# Patient Record
Sex: FEMALE | Race: WHITE | NOT HISPANIC OR LATINO | Employment: OTHER | URBAN - METROPOLITAN AREA
[De-identification: names, ages, dates, MRNs, and addresses within clinical notes are randomized per-mention and may not be internally consistent; named-entity substitution may affect disease eponyms.]

---

## 2017-04-04 ENCOUNTER — RECORDS - HEALTHEAST (OUTPATIENT)
Dept: LAB | Facility: CLINIC | Age: 62
End: 2017-04-04

## 2017-04-04 LAB
CHOLEST SERPL-MCNC: 199 MG/DL
FASTING STATUS PATIENT QL REPORTED: YES
HDLC SERPL-MCNC: 46 MG/DL
LDLC SERPL CALC-MCNC: 134 MG/DL
TRIGL SERPL-MCNC: 96 MG/DL

## 2017-04-06 ENCOUNTER — HOSPITAL ENCOUNTER (OUTPATIENT)
Dept: MAMMOGRAPHY | Facility: CLINIC | Age: 62
Discharge: HOME | End: 2017-04-06
Attending: PHYSICIAN ASSISTANT

## 2017-04-06 DIAGNOSIS — Z12.31 VISIT FOR SCREENING MAMMOGRAM: ICD-10-CM

## 2017-04-10 ENCOUNTER — RECORDS - HEALTHEAST (OUTPATIENT)
Dept: ADMINISTRATIVE | Facility: OTHER | Age: 62
End: 2017-04-10

## 2017-06-05 ENCOUNTER — RECORDS - HEALTHEAST (OUTPATIENT)
Dept: BONE DENSITY | Facility: CLINIC | Age: 62
End: 2017-06-05

## 2017-06-05 ENCOUNTER — RECORDS - HEALTHEAST (OUTPATIENT)
Dept: ADMINISTRATIVE | Facility: OTHER | Age: 62
End: 2017-06-05

## 2017-06-05 DIAGNOSIS — Z78.0 ASYMPTOMATIC MENOPAUSAL STATE: ICD-10-CM

## 2017-06-26 ENCOUNTER — AMBULATORY - HEALTHEAST (OUTPATIENT)
Dept: ADMINISTRATIVE | Facility: REHABILITATION | Age: 62
End: 2017-06-26

## 2017-06-26 DIAGNOSIS — Z98.890 S/P FOOT SURGERY, LEFT: ICD-10-CM

## 2017-06-26 DIAGNOSIS — M25.572 LEFT ANKLE PAIN: ICD-10-CM

## 2017-06-28 ENCOUNTER — OFFICE VISIT - HEALTHEAST (OUTPATIENT)
Dept: PHYSICAL THERAPY | Facility: REHABILITATION | Age: 62
End: 2017-06-28

## 2017-06-28 DIAGNOSIS — M25.572 ACUTE LEFT ANKLE PAIN: ICD-10-CM

## 2017-06-28 DIAGNOSIS — M25.673 DECREASED ROM OF ANKLE: ICD-10-CM

## 2017-07-12 ENCOUNTER — OFFICE VISIT - HEALTHEAST (OUTPATIENT)
Dept: PHYSICAL THERAPY | Facility: REHABILITATION | Age: 62
End: 2017-07-12

## 2017-07-12 DIAGNOSIS — M25.572 ACUTE LEFT ANKLE PAIN: ICD-10-CM

## 2017-07-12 DIAGNOSIS — M25.673 DECREASED ROM OF ANKLE: ICD-10-CM

## 2017-07-14 ENCOUNTER — OFFICE VISIT - HEALTHEAST (OUTPATIENT)
Dept: PHYSICAL THERAPY | Facility: REHABILITATION | Age: 62
End: 2017-07-14

## 2017-07-14 DIAGNOSIS — M25.572 ACUTE LEFT ANKLE PAIN: ICD-10-CM

## 2017-07-14 DIAGNOSIS — M25.673 DECREASED ROM OF ANKLE: ICD-10-CM

## 2017-07-17 ENCOUNTER — OFFICE VISIT - HEALTHEAST (OUTPATIENT)
Dept: PHYSICAL THERAPY | Facility: REHABILITATION | Age: 62
End: 2017-07-17

## 2017-07-17 DIAGNOSIS — M25.572 ACUTE LEFT ANKLE PAIN: ICD-10-CM

## 2017-07-17 DIAGNOSIS — M25.673 DECREASED ROM OF ANKLE: ICD-10-CM

## 2017-07-21 ENCOUNTER — OFFICE VISIT - HEALTHEAST (OUTPATIENT)
Dept: PHYSICAL THERAPY | Facility: REHABILITATION | Age: 62
End: 2017-07-21

## 2017-07-21 DIAGNOSIS — M25.673 DECREASED ROM OF ANKLE: ICD-10-CM

## 2017-07-21 DIAGNOSIS — M25.572 ACUTE LEFT ANKLE PAIN: ICD-10-CM

## 2017-07-26 ENCOUNTER — OFFICE VISIT - HEALTHEAST (OUTPATIENT)
Dept: PHYSICAL THERAPY | Facility: REHABILITATION | Age: 62
End: 2017-07-26

## 2017-07-26 DIAGNOSIS — M25.572 ACUTE LEFT ANKLE PAIN: ICD-10-CM

## 2017-07-26 DIAGNOSIS — M25.673 DECREASED ROM OF ANKLE: ICD-10-CM

## 2017-08-10 ENCOUNTER — OFFICE VISIT - HEALTHEAST (OUTPATIENT)
Dept: PHYSICAL THERAPY | Facility: REHABILITATION | Age: 62
End: 2017-08-10

## 2017-08-10 DIAGNOSIS — M25.673 DECREASED ROM OF ANKLE: ICD-10-CM

## 2017-08-10 DIAGNOSIS — M25.572 ACUTE LEFT ANKLE PAIN: ICD-10-CM

## 2017-08-23 ENCOUNTER — OFFICE VISIT - HEALTHEAST (OUTPATIENT)
Dept: PHYSICAL THERAPY | Facility: REHABILITATION | Age: 62
End: 2017-08-23

## 2017-08-23 DIAGNOSIS — M25.572 ACUTE LEFT ANKLE PAIN: ICD-10-CM

## 2017-08-23 DIAGNOSIS — M25.673 DECREASED ROM OF ANKLE: ICD-10-CM

## 2017-08-23 DIAGNOSIS — Z78.0 ASYMPTOMATIC POSTMENOPAUSAL STATUS: ICD-10-CM

## 2018-04-11 ENCOUNTER — HOSPITAL ENCOUNTER (OUTPATIENT)
Dept: MAMMOGRAPHY | Facility: CLINIC | Age: 63
Discharge: HOME | End: 2018-04-11
Attending: PHYSICIAN ASSISTANT

## 2018-04-11 DIAGNOSIS — Z12.31 VISIT FOR SCREENING MAMMOGRAM: ICD-10-CM

## 2019-05-29 ENCOUNTER — HOSPITAL ENCOUNTER (OUTPATIENT)
Dept: MAMMOGRAPHY | Facility: CLINIC | Age: 64
Discharge: HOME | End: 2019-05-29
Attending: PHYSICIAN ASSISTANT

## 2019-05-29 DIAGNOSIS — Z12.31 VISIT FOR SCREENING MAMMOGRAM: ICD-10-CM

## 2019-12-23 ENCOUNTER — RECORDS - HEALTHEAST (OUTPATIENT)
Dept: LAB | Facility: CLINIC | Age: 64
End: 2019-12-23

## 2019-12-26 LAB — BACTERIA SPEC CULT: ABNORMAL

## 2020-01-09 ENCOUNTER — RECORDS - HEALTHEAST (OUTPATIENT)
Dept: LAB | Facility: CLINIC | Age: 65
End: 2020-01-09

## 2020-01-09 ENCOUNTER — RECORDS - HEALTHEAST (OUTPATIENT)
Dept: ADMINISTRATIVE | Facility: OTHER | Age: 65
End: 2020-01-09

## 2020-01-09 LAB
ALBUMIN SERPL-MCNC: 3.7 G/DL (ref 3.5–5)
ALP SERPL-CCNC: 63 U/L (ref 45–120)
ALT SERPL W P-5'-P-CCNC: 38 U/L (ref 0–45)
ANION GAP SERPL CALCULATED.3IONS-SCNC: 8 MMOL/L (ref 5–18)
AST SERPL W P-5'-P-CCNC: 27 U/L (ref 0–40)
BILIRUB SERPL-MCNC: 0.6 MG/DL (ref 0–1)
BUN SERPL-MCNC: 11 MG/DL (ref 8–22)
CALCIUM SERPL-MCNC: 8.9 MG/DL (ref 8.5–10.5)
CHLORIDE BLD-SCNC: 104 MMOL/L (ref 98–107)
CHOLEST SERPL-MCNC: 198 MG/DL
CO2 SERPL-SCNC: 27 MMOL/L (ref 22–31)
CREAT SERPL-MCNC: 0.7 MG/DL (ref 0.6–1.1)
FASTING STATUS PATIENT QL REPORTED: YES
GFR SERPL CREATININE-BSD FRML MDRD: >60 ML/MIN/1.73M2
GLUCOSE BLD-MCNC: 106 MG/DL (ref 70–125)
HDLC SERPL-MCNC: 42 MG/DL
LDLC SERPL CALC-MCNC: 121 MG/DL
POTASSIUM BLD-SCNC: 4.2 MMOL/L (ref 3.5–5)
PROT SERPL-MCNC: 7.2 G/DL (ref 6–8)
SODIUM SERPL-SCNC: 139 MMOL/L (ref 136–145)
TRIGL SERPL-MCNC: 173 MG/DL

## 2020-01-13 ENCOUNTER — RECORDS - HEALTHEAST (OUTPATIENT)
Dept: ADMINISTRATIVE | Facility: OTHER | Age: 65
End: 2020-01-13

## 2020-01-28 ENCOUNTER — RECORDS - HEALTHEAST (OUTPATIENT)
Dept: BONE DENSITY | Facility: CLINIC | Age: 65
End: 2020-01-28

## 2020-01-28 ENCOUNTER — RECORDS - HEALTHEAST (OUTPATIENT)
Dept: ADMINISTRATIVE | Facility: OTHER | Age: 65
End: 2020-01-28

## 2020-01-28 DIAGNOSIS — Z13.820 ENCOUNTER FOR SCREENING FOR OSTEOPOROSIS: ICD-10-CM

## 2020-01-28 DIAGNOSIS — Z78.0 ASYMPTOMATIC MENOPAUSAL STATE: ICD-10-CM

## 2020-06-17 ENCOUNTER — HOSPITAL ENCOUNTER (OUTPATIENT)
Dept: MAMMOGRAPHY | Facility: CLINIC | Age: 65
Discharge: HOME | End: 2020-06-17

## 2020-06-17 ENCOUNTER — COMMUNICATION - HEALTHEAST (OUTPATIENT)
Dept: TELEHEALTH | Facility: CLINIC | Age: 65
End: 2020-06-17

## 2020-06-17 DIAGNOSIS — Z12.31 SCREENING MAMMOGRAM, ENCOUNTER FOR: ICD-10-CM

## 2021-05-24 ENCOUNTER — RECORDS - HEALTHEAST (OUTPATIENT)
Dept: ADMINISTRATIVE | Facility: CLINIC | Age: 66
End: 2021-05-24

## 2021-05-26 ENCOUNTER — RECORDS - HEALTHEAST (OUTPATIENT)
Dept: ADMINISTRATIVE | Facility: CLINIC | Age: 66
End: 2021-05-26

## 2021-05-27 ENCOUNTER — RECORDS - HEALTHEAST (OUTPATIENT)
Dept: ADMINISTRATIVE | Facility: CLINIC | Age: 66
End: 2021-05-27

## 2021-05-28 ENCOUNTER — RECORDS - HEALTHEAST (OUTPATIENT)
Dept: ADMINISTRATIVE | Facility: CLINIC | Age: 66
End: 2021-05-28

## 2021-05-29 ENCOUNTER — RECORDS - HEALTHEAST (OUTPATIENT)
Dept: ADMINISTRATIVE | Facility: CLINIC | Age: 66
End: 2021-05-29

## 2021-05-30 ENCOUNTER — RECORDS - HEALTHEAST (OUTPATIENT)
Dept: ADMINISTRATIVE | Facility: CLINIC | Age: 66
End: 2021-05-30

## 2021-05-31 ENCOUNTER — RECORDS - HEALTHEAST (OUTPATIENT)
Dept: ADMINISTRATIVE | Facility: CLINIC | Age: 66
End: 2021-05-31

## 2021-06-11 NOTE — PROGRESS NOTES
[facility]   Foot/Ankle Initial Evaluation    Patient Name: Madelin Payton  Date of evaluation: 6/28/2017  Referral Diagnosis: S/P foot surgery, left  Referring provider: Cassidy Valderrama PA-C  Visit Diagnosis:     ICD-10-CM    1. Decreased ROM of ankle M25.673    2. Acute left ankle pain M25.572        Assessment:    Madelin was evaluated for post op ankle surgery that occurred on 4/12/17. Patient stated concern with pain, unsteadiness and limited strength effecting her function. Patient expressed a of a fear of falling because of unsteadiness and weakness in the L leg and ankle. PT evaluation found decreased dorsiflexion ROM in L ankle and weakness in inversion of the L ankle. PT started HEP this session with ankle ROM exercise strengthening and stretching including a SLS. PT to focus on LE strength and ROM, starting seeing patient twice a week and reassessing in 2 weeks to adjust frequency. PT educated patient on plan of care, patient agreed and is willing to start therapy.    Pt. is appropriate for skilled PT intervention as outlined in the Plan of Care (POC).  Pt. is a good candidate for skilled PT services to improve pain levels and function.    Goals:  Pt. will be independent with home exercise program in : 4 weeks  Pt will: increase distance able to walk to 4 blocks without stopping, within 6 weeks.  Pt will: increase L ankle dorsiflextion ROM to greater than 10 degrees within 6 weeks  Pt will: increase score on LEFS by 20 points, within 8weeks    Barriers to Learning or Achieving Goals:  No Barriers.    Patient's expectations/goals are realistic.       Plan / Patient Instructions:    Patient would like to work on: balance, strengthing of LE, ROM     Plan of Care:   Communication with: Referral Source  Patient Related Instruction: Nature of Condition;Treatment plan and rationale;Self Care instruction;Basis of treatment;Body mechanics  Times per Week: 1-2  Number of Weeks: 12  Number of Visits:  12  Precautions / Restrictions : weight bearing as tolerated  Therapeutic Exercise: ROM;Stretching;Strengthening  Neuromuscular Reeducation: balance/proprioception;core  Manual Therapy: soft tissue mobilization;joint mobilization;myofascial release;muscle energy  Other Plan #1: prn    Plan for next visit: progress balance exercise, foam balance, standing hip abduction,      Subjective:       History of Present Illness:    Madelin is a 61 y.o. female who presents to therapy today for physical therapy post op ankle surgery 17. She underwent a posterior tibial tendonrepair with a tendon transfer on the L ankle secondary to chronic changes over time. She denies any history of injury. Per report, she has lost strength and flexibility and wants to address her stability.  Currently she is struggling with steadiness and standing duration. She wears the air cast when she wants more stability when she is working. Also of note, she does report some catching in the same sided L knee as well as some vascular changes with venous pooling on the L. The pooling is located around her neoprene sleeve.      Pain Ratin  Pain rating at best: 0, when sitting sometimes  Pain rating at worst: 7  Pain description: tingling and throabing and stinging,       She has worked part time, she works one 24 hour shift per week. She tries to elevate and ice her foot as much as possible.     Functional limitations are described as occurring with:   balance  standing long periods  walking for 2 blocks    Patient reports benefit from:  anti-inflammatory, pain medication, cold     Objective:      Note: Items left blank indicates the item was not performed or not indicated at the time of the evaluation.    Patient Outcome Measures :      Lower Extremity Functional Scale (_/80): 33   Scores range from 0-80, where a score of 80 represents maximum function. The minimal clinically important difference is a positive change of 9 points.    Ankle/Foot  Examination  1. Decreased ROM of ankle     2. Acute left ankle pain       Involved Side: Left  Gait Observation: slight lateral limp to R side    Foot/Ankle ROM:    Date: 6/28/17     Ankle ROM( ) AROM in degrees AROM in degrees AROM in degrees    Right Left Right Left Right Left   Dorsiflexion, gastroc (10 ) 3 8       Dorsiflexion, soleus (20 ) 15 6       Plantar Flexion (50 ) 44 45       Inversion (45-60 ) 40 30       Eversion (15-30 ) 25 30       Great Toe Extension (70 ) 90 53       Great Toe Flexion (MTP45 , IP90 )             Venous pooling could be due to cutting done by ankle compression sleeve. She also has tried an ace badnage which does the same. PT advissed on taking compression off more often during the days.      Foot/Ankle Strength     Date: 6/28/17     Ankle/Foot Strength (/5) MMT MMT MMT    Right Left Right Left Right Left   Dorsiflexion 5 5       Plantarflexion 5 5       Inversion 5 3       Eversion 5 4       Great Toe Extension 5 4+       Remaining LE [facility]'s    PT Evaluation Code: (Please list factors)  Patient History/Comorbidities: arthritis  Examination: foot/ankle  Clinical Presentation: stable   Clinical Decision Making: low    Patient History/  Comorbidities Examination  (body structures and functions, activity limitations, and/or participation restrictions) Clinical Presentation Clinical Decision Making (Complexity)   No documented Comorbidities or personal factors 1-2 Elements Stable and/or uncomplicated Low   1-2 documented comorbidities or personal factor 3 Elements Evolving clinical presentation with changing characteristics Moderate   3-4 documented comorbidities or personal factors 4 or more Unstable and unpredictable High           Treatment Today       TREATMENT MINUTES COMMENTS   Evaluation 30    Self-care/ Home management     Manual therapy     Neuromuscular Re-education     Therapeutic Activity     Therapeutic Exercises 25 See flow sheet. PT educated pt on POC. Pt agreed to plan of  care.   Gait training     Modality__________________                Total 55    Blank areas are intentional and mean the treatment did not include these items.            Rosita Li, [facility]  Amanda West  6/28/2017  8:00 AM

## 2021-06-11 NOTE — PROGRESS NOTES
[facility] Daily Progress     Patient Name: Madelin Payton  Date: 2017  Visit #: 3  Referral Diagnosis: s/p ankle surgery  Referring provider: Alek Phelan DPM  Visit Diagnosis:     ICD-10-CM    1. Decreased ROM of ankle M25.673    2. Acute left ankle pain M25.572          Assessment:   Patient is presenting with improving swelling and coloration in her foot. She continues to be challenged with the BAPS board and general proprioception of the foot. PT will continue to target AROM and strength in her ankle with the home exercises and therapy sessions.     HEP/POC compliance is  good .  Patient is benefitting from skilled physical therapy and is making steady progress toward functional goals.    Goal Status:  Pt. will be independent with home exercise program in : 4 weeks  Pt will: increase distance able to walk to 4 blocks without stopping, within 6 weeks.  Pt will: increase L ankle dorsiflextion ROM to greater than 10 degrees within 6 weeks  Pt will: increase score on LEFS by 20 points, within 8weeks    Plan / Patient Education:     Continue with initial plan of care.  Progress with home program as tolerated.    PLAN for next visit: Consider band strengthening  Subjective:   Patient is noting improvement in the discoloration. She continues to note changes that occur with being on her feet but does lie down to reduce. She currently is able to walk 4-5 blocks, twice Wednesday and Thursday. Patient would like therapist to look at her position in stand    Pain Ratin, 1-2/10 while walking      Objective:     PT continued to encourage patient to attempt adding walking to routine, starting short distances and stopping if there is any pain.     Exercises:  Exercise #1: SLS x30sec catie  Comment #1: ankle pumps x10 on L  Exercise #2: ankle circles x10 both directions on L  Comment #2: ankle alphabet 1 round on L  Exercise #3: seated dorsiflection stretch with belt x30 seconds catie  Comment #3: BAPS  board level 1, circles both directions x10  Exercise #4: foam work 1) SLS x30 sec catie 2) NBOS x30 sec  Comment #4: standing hip abduction x10 catie        Treatment Today     TREATMENT MINUTES COMMENTS   Evaluation     Self-care/ Home management     Manual therapy     Neuromuscular Re-education     Therapeutic Activity     Therapeutic Exercises 29 See flow sheet   Gait training     Modality__________________                Total 29    Blank areas are intentional and mean the treatment did not include these items.         Amanda West  7/14/2017

## 2021-06-11 NOTE — PROGRESS NOTES
[facility] Daily Progress     Patient Name: Madelin Payton  Date: 2017  Visit #: 2  PTA visit #:    Referral Diagnosis:   Referring provider: Alek Phelan DPM  Visit Diagnosis:     ICD-10-CM    1. Decreased ROM of ankle M25.673    2. Acute left ankle pain M25.572          Assessment:   Madelin presented noting decreased pain in the ankle since last session. Patient has decreased use of boot, and increased use of knee high compression socks. PT observed ankle having decreased swelling and decreased venous pooling since last session. PT focused on reviewing exercises, progressing ROM, balance and strengthening this session. PT encouraged patient to start walking short distances. PT plan to progress HEP and continue work on balance next session.      HEP/POC compliance is  good .  Patient is benefitting from skilled physical therapy and is making steady progress toward functional goals.    Goal Status:  Pt. will be independent with home exercise program in : 4 weeks  Pt will: increase distance able to walk to 4 blocks without stopping, within 6 weeks.  Pt will: increase L ankle dorsiflextion ROM to greater than 10 degrees within 6 weeks  Pt will: increase score on LEFS by 20 points, within 8weeks    Plan / Patient Education:     Continue with initial plan of care.  Progress with home program as tolerated.    Subjective:   She is still noting an issue with the venous pooling that is causing her pain. But when that is not present she has no pain. She no longer has the stinging feeling of pain. She is only wearing the boot at work, and is no longer wearing it around the house. She presented with knee high compression socks which have been working better than the short ones she wore last week. Patient reported compliance and improvement to exercises done last session.     Pain Ratin, pain when walking is still present        Objective:   PT observed ankle, finding decreased swelling and venous  pooling. Scar tissue observed to be healing appropriately. PT encouraged patient with the improvement and suggested continued use of compression socks, elevation and icing.     PT encouraged patient to attempt adding walking to routine, starting short distances and stopping if there is any pain.     Exercises:  Exercise #1: SLS x30sec catie  Comment #1: ankle pumps x10 on L  Exercise #2: ankle circles x10 both directions on L  Comment #2: ankle alphabet 1 round on L  Exercise #3: seated dorsiflection stretch with belt x30 seconds catie  Comment #3: BAPS board level 1, circles both directions x10  Exercise #4: foam work 1) SLS x30 sec catie 2) NBOS x30 sec  Comment #4: standing hip abduction x10 catie        Treatment Today     TREATMENT MINUTES COMMENTS   Evaluation     Self-care/ Home management     Manual therapy     Neuromuscular Re-education     Therapeutic Activity     Therapeutic Exercises 29 See flow sheet   Gait training     Modality__________________                Total 29    Blank areas are intentional and mean the treatment did not include these items.     Rosita Li, SPT      Britt Trejo  7/12/2017

## 2021-06-11 NOTE — PROGRESS NOTES
[facility] Daily Progress     Patient Name: Madelin Payton  Date: 2017  Visit #: 4  Referral Diagnosis: s/p ankle surgery  Referring provider: Alek Phelan DPM  Visit Diagnosis:     ICD-10-CM    1. Decreased ROM of ankle M25.673    2. Acute left ankle pain M25.572          Assessment:   Patient is presenting with decreased pain and increased function of her ankle. She continues to be challenged but has improved with using the BAPS board and general proprioception of the foot. PT progressed HEP with ankle band strengthening. PT will continue to target AROM and strength in her ankle with the home exercises and therapy sessions.     HEP/POC compliance is  good .  Patient is benefitting from skilled physical therapy and is making steady progress toward functional goals.    Goal Status:  Pt. will be independent with home exercise program in : 4 weeks  Pt will: increase distance able to walk to 4 blocks without stopping, within 6 weeks.  Pt will: increase L ankle dorsiflextion ROM to greater than 10 degrees within 6 weeks  Pt will: increase score on LEFS by 20 points, within 8weeks    Plan / Patient Education:     Continue with initial plan of care.  Progress with home program as tolerated.    PLAN for next visit: go to level 3 for BAPS ball, toe raising total gym, standing squat, down steps  Subjective:   Patient feels as if she is doing good. She is having a little more soreness but thinks it is because she is doing well. Patient walked a mile each day over the weekend which caused some soreness, but no pain. Patient no longer has the stinging feeling in the bottom of her foot.     Pain Ratin, 2/10 while walking      Objective:       PT continued to encourage patient to attempt adding walking to routine, starting short distances and stopping if there is any pain.     Exercises: see flow sheet)  Exercise #1: SLS x30sec catie--> educated on increasign hold time   Comment #1: ankle pumps x10 on  L  Exercise #2: ankle circles x10 both directions on L  Comment #2: ankle alphabet 1 round on L  Exercise #3: seated dorsiflection stretch with belt x30 seconds catie  Comment #3: BAPS board level 2 1) forward/back x 10 B 2) Side to side x 10 3) circles x10  Exercise #4: foam work 1) SLS B 2 x45 second finger tip support 2) NBOS x not performed this session  Comment #4: standing hip abduction x15 B, cued on position  Exercise #5: Nustep x 5 minutes  Comment #5: Towel scrunch B x 10  Exercise #6: Total gym level 20 1) BSquats x 25  Comment #6: ankle orange band 1) dorsiflexion x15 2)plantar flexion x15  Exercise #7: side stepping x2 laps         Treatment Today     TREATMENT MINUTES COMMENTS   Evaluation     Self-care/ Home management     Manual therapy     Neuromuscular Re-education     Therapeutic Activity     Therapeutic Exercises 29 See flow sheet   Gait training     Modality__________________                Total 29    Blank areas are intentional and mean the treatment did not include these items.       Rosita Li, SPT  Amanda West  7/17/2017

## 2021-06-12 NOTE — PROGRESS NOTES
[facility] Daily Progress     Patient Name: Madelin Payton  Date: 8/10/2017  Visit #: 7  Referral Diagnosis: s/p ankle surgery  Referring provider: Alek Phelan DPM  Visit Diagnosis:     ICD-10-CM    1. Decreased ROM of ankle M25.673    2. Acute left ankle pain M25.572          Assessment:   Patient noting decreased ambulation speed this visit and patient educated the patient on increasing patty and decreasing stride length. She continue to have difficulty with PF against gravity with full weight. PT gave the patient some further alternative exercises this visit to continue addressing this deficit. PT will have the patient follow-up once more in 2-3 weeks for further exercise progressions.    HEP/POC compliance is  good .  Patient is benefitting from skilled physical therapy and is making steady progress toward functional goals.    Goal Status:  Pt. will be independent with home exercise program in : 4 weeks  Pt will: increase distance able to walk to 4 blocks without stopping, within 6 weeks.  Pt will: increase L ankle dorsiflextion ROM to greater than 10 degrees within 6 weeks  Pt will: increase score on LEFS by 20 points, within 8weeks    Plan / Patient Education:     Continue with initial plan of care.  Progress with home program as tolerated.    PLAN for next visit:  bouncing on tramp, walking lunges?  Subjective:   Patient feels like she is walking slower than everyone else and it is getting embarrassing. She is noting tolerance for gait duration.  She continues to note some pain in the arch of her foot with ambulation.    Pain Ratin, 2-3/10 while walking      Objective:   PT educated patient on use of tennis ball to address tightness, using every morning for 1-2 min- patient thinks this is helping     PT continued to encourage patient to attempt adding walking to routine, starting short distances and stopping if there is any pain.     Exercises: see flow sheet)  Exercise #1: SLS  x30sec catie--> educated on increasign hold time   Comment #1: ankle pumps x10 on L  Exercise #2: ankle circles x10 both directions on L  Comment #2: ankle alphabet 1 round on L  Exercise #3: seated dorsiflection stretch with belt x30 seconds catie  Comment #3: BAPS board level 4 1) forward/back x 15 B 2) Side to side x 15 3) circles x15 both ways  Exercise #4: foam work 1) SLS B 2 x45 second finger tip support 2) NBOS x not performed this session  Comment #4: standing hip abduction x15 B, cued on position  Exercise #5: Treadmill x 5 minutes 2.0-2.2 MPH, educated on increasing step patty  Comment #5: Towel scrunch B x 10  Exercise #6: Total gym level 26 1) BSquats x 202) toe raises level 15 x 15, very hard  Comment #6: ankle orange band R 1) dorsiflexion x15 2)plantar flexion x15 3)Eversion 4) Inversion  Exercise #7: side stepping x2 laps   Comment #7: step ups x15 catie 6in  Exercise #8: squating x15  Comment #8: step downs 8inch step x10 catie  Exercise #9: Standing Heel/toe raises x 10 Alt B  Comment #9: sitting heel toe/raies, instructed on for home    Foot/Ankle ROM:    Date: 6/28/17 8/10/17    Ankle ROM( ) AROM in degrees AROM in degrees AROM in degrees    Right Left Right Left Right Left   Dorsiflexion, gastroc (10 ) 3 8  9     Dorsiflexion, soleus (20 ) 15 6  13     Plantar Flexion (50 ) 44 45  58     Inversion (45-60 ) 40 30       Eversion (15-30 ) 25 30       Great Toe Extension (70 ) 90 53         Figure 8- L49.5 cm    Treatment Today     TREATMENT MINUTES COMMENTS   Evaluation     Self-care/ Home management     Manual therapy     Neuromuscular Re-education     Therapeutic Activity     Therapeutic Exercises 29 See flow sheet.    Gait training     Modality__________________                Total 29    Blank areas are intentional and mean the treatment did not include these items.       Amanda West  8/10/2017

## 2021-06-12 NOTE — PROGRESS NOTES
[facility] Daily Progress     Patient Name: Madelin Payton  Date: 2017  Visit #: 8  Referral Diagnosis: s/p ankle surgery  Referring provider: Alek Phelan DPM  Visit Diagnosis:     ICD-10-CM    1. Decreased ROM of ankle M25.673    2. Acute left ankle pain M25.572          Assessment:   Patient continues to verbalize improvement and progressing in her ability to heel raise with body weight. She feels that all of her needs have been met and comfortable moving forward independently. PT re-measured L ankle ROM to find improvement in ROM, see measurements below. PT will hold chart open for one month.    HEP/POC compliance is  good .  Patient is benefitting from skilled physical therapy and is making steady progress toward functional goals.    Goal Status:  Pt. will be independent with home exercise program in : 4 weeks  Pt will: increase distance able to walk to 4 blocks without stopping, within 6 weeks.  Pt will: increase L ankle dorsiflextion ROM to greater than 10 degrees within 6 weeks  Pt will: increase score on LEFS by 20 points, within 8weeks    Plan / Patient Education:   Patient to start independent home trial. PT will hold chart open for one month.  Subjective:   Patient feels like she is doing good. She is doing better with the heel raises. She is feeling ready to continue independently because she has a great group of exercises  Pain Ratin       Objective:   PT educated patient on use of tennis ball to address tightness, using every morning for 1-2 min- patient thinks this is helping     PT continued to encourage patient to attempt adding walking to routine, starting short distances and stopping if there is any pain.     Exercises: see flow sheet)  Exercise #1: SLS x30sec catie--> educated on increasign hold time   Comment #1: ankle pumps x10 on L  Exercise #2: ankle circles x10 both directions on L  Comment #2: ankle alphabet 1 round on L  Exercise #3: seated dorsiflection stretch  with belt x30 seconds catie  Comment #3: BAPS board level 4 1) forward/back x 15 B 2) Side to side x 15 3) circles x15 both ways  Exercise #4: foam work 1) SLS B 2 x45 second finger tip support 2) NBOS x not performed this session  Comment #4: standing hip abduction x15 B, cued on position  Exercise #5: Treadmill x 5 minutes 2.0-2.2 MPH, educated on increasing step patty  Comment #5: Towel scrunch B x 10  Exercise #6: Total gym level 26 1) BSquats x 202) toe raises level 15 x 15, very hard  Comment #6: ankle orange band R 1) dorsiflexion x15 2)plantar flexion x15 3)Eversion 4) Inversion  Exercise #7: side stepping x2 laps   Comment #7: step ups x15 catie 6in  Exercise #8: squating x15  Comment #8: step downs 8inch step x10 catie  Exercise #9: Standing Heel/toe raises x 10 Alt B  Comment #9: sitting heel toe/raies, instructed on for home    Foot/Ankle ROM:    Date: 6/28/17 8/10/17 8/23/17   Ankle ROM( ) AROM in degrees AROM in degrees AROM in degrees    Right Left Right Left Right Left   Dorsiflexion, gastroc (10 ) 3 8  9  10   Dorsiflexion, soleus (20 ) 15 6  13  18   Plantar Flexion (50 ) 44 45  58  56   Inversion (45-60 ) 40 30       Eversion (15-30 ) 25 30       Great Toe Extension (70 ) 90 53         Figure 8- L49.5 cm  SLS B 4 seconds    Lower Extremity Functional Scale (_/80): 47--> was 33/80      Treatment Today     TREATMENT MINUTES COMMENTS   Evaluation     Self-care/ Home management     Manual therapy     Neuromuscular Re-education     Therapeutic Activity     Therapeutic Exercises 24 See flow sheet, ROM measurements taken and LEFS completed   Gait training     Modality__________________                Total 24    Blank areas are intentional and mean the treatment did not include these items.       Amanda West  8/23/2017

## 2021-06-12 NOTE — PROGRESS NOTES
[facility] Daily Progress     Patient Name: Madelin Payton  Date: 2017  Visit #: 6  Referral Diagnosis: s/p ankle surgery  Referring provider: Alek Phelan DPM  Visit Diagnosis:     ICD-10-CM    1. Decreased ROM of ankle M25.673    2. Acute left ankle pain M25.572          Assessment:   Patient continues to present with decreased pain and increased function of her ankle. She notes pain only being present with walking that she believes is soreness. She reports benefit from using the tennis ball on her arch in the morning. She continues to be challenged but has improved with using the BAPS board and general proprioception of the foot. PT progressed HEP this session. PT will continue to target AROM and strength in her ankle with the home exercises and therapy sessions.     HEP/POC compliance is  good .  Patient is benefitting from skilled physical therapy and is making steady progress toward functional goals.    Goal Status:  Pt. will be independent with home exercise program in : 4 weeks  Pt will: increase distance able to walk to 4 blocks without stopping, within 6 weeks.  Pt will: increase L ankle dorsiflextion ROM to greater than 10 degrees within 6 weeks  Pt will: increase score on LEFS by 20 points, within 8weeks    Plan / Patient Education:     Continue with initial plan of care.  Progress with home program as tolerated.    PLAN for next visit: continue to use level 3 for BAPS, measure more ROM, toe raising, bouncing on tramp  Subjective:   Patient feels as if she is doing good! She only has some pain when walking on her foot. Patient continues to be compliant to HEP.     Pain Ratin, 3/10 while walking      Objective:   PT educated patient on use of tennis ball to address tightness, using every morning for 1-2 min- patient thinks this is helping     PT continued to encourage patient to attempt adding walking to routine, starting short distances and stopping if there is any pain.      Exercises: see flow sheet)  Exercise #1: SLS x30sec catie--> educated on increasign hold time   Comment #1: ankle pumps x10 on L  Exercise #2: ankle circles x10 both directions on L  Comment #2: ankle alphabet 1 round on L  Exercise #3: seated dorsiflection stretch with belt x30 seconds catie  Comment #3: BAPS board level 3 1) forward/back x 15 B 2) Side to side x 15 3) circles x10 both ways  Exercise #4: foam work 1) SLS B 2 x45 second finger tip support 2) NBOS x not performed this session  Comment #4: standing hip abduction x15 B, cued on position  Exercise #5: recumbant bike x5min  Comment #5: Towel scrunch B x 10  Exercise #6: Total gym level 26 1) BSquats x 15 2) toe raises level 18 x20  Comment #6: ankle orange band R 1) dorsiflexion x15 2)plantar flexion x15 3)Eversion 4) Inversion  Exercise #7: side stepping x2 laps   Comment #7: step ups x15 catie 6in  Exercise #8: squating x10  Comment #8: step downs 8inch step x10 catie    Foot/Ankle ROM:    Date: 6/28/17 7/21/17    Ankle ROM( ) AROM in degrees AROM in degrees AROM in degrees    Right Left Right Left Right Left   Dorsiflexion, gastroc (10 ) 3 8  12, 10     Dorsiflexion, soleus (20 ) 15 6  13, 9     Plantar Flexion (50 ) 44 45       Inversion (45-60 ) 40 30       Eversion (15-30 ) 25 30       Great Toe Extension (70 ) 90 53             Treatment Today     TREATMENT MINUTES COMMENTS   Evaluation     Self-care/ Home management     Manual therapy     Neuromuscular Re-education     Therapeutic Activity     Therapeutic Exercises 29 See flow sheet.    Gait training     Modality__________________                Total 29    Blank areas are intentional and mean the treatment did not include these items.       Rosita Li, SPT  [facility]  7/26/2017

## 2021-06-12 NOTE — PROGRESS NOTES
[facility] Daily Progress     Patient Name: Madelin Payton  Date: 2017  Visit #: 5  Referral Diagnosis: s/p ankle surgery  Referring provider: Alek Phelan DPM  Visit Diagnosis:     ICD-10-CM    1. Decreased ROM of ankle M25.673    2. Acute left ankle pain M25.572          Assessment:   Patient continues to present with decreased pain and increased function of her ankle. She is noting increased soreness in her arch of the foot which could be from doing more. She continues to be challenged but has improved with using the BAPS board and general proprioception of the foot. PT progressed HEP and remeasured ROM this session finding increased values in dorsiflexion. PT will continue to target AROM and strength in her ankle with the home exercises and therapy sessions.     HEP/POC compliance is  good .  Patient is benefitting from skilled physical therapy and is making steady progress toward functional goals.    Goal Status:  Pt. will be independent with home exercise program in : 4 weeks  Pt will: increase distance able to walk to 4 blocks without stopping, within 6 weeks.  Pt will: increase L ankle dorsiflextion ROM to greater than 10 degrees within 6 weeks  Pt will: increase score on LEFS by 20 points, within 8weeks    Plan / Patient Education:     Continue with initial plan of care.  Progress with home program as tolerated.    PLAN for next visit: go to level 3 for BAPS ball increase level or reps, toe raising total gym, standing squat, step downs, measure more ROM  Subjective:   Patient feels as if she is doing pretty good, she is having a little more pain in the arch of her foot and has some soreness around the incision. Patient continues to be compliant to HEP.     Pain Ratin, 3/10 while walking      Objective:   PT educated patient on use of tennis ball to address tightness, using every morning for 1-2 min.     PT continued to encourage patient to attempt adding walking to routine,  starting short distances and stopping if there is any pain.     Exercises: see flow sheet)  Exercise #1: SLS x30sec catie--> educated on increasign hold time   Comment #1: ankle pumps x10 on L  Exercise #2: ankle circles x10 both directions on L  Comment #2: ankle alphabet 1 round on L  Exercise #3: seated dorsiflection stretch with belt x30 seconds catie  Comment #3: BAPS board level 3 1) forward/back x 15 B 2) Side to side x 15 3) circles x10 both ways  Exercise #4: foam work 1) SLS B 2 x45 second finger tip support 2) NBOS x not performed this session  Comment #4: standing hip abduction x15 B, cued on position  Exercise #5: Nustep x 5 minutes  Comment #5: Towel scrunch B x 10  Exercise #6: Total gym level 20 1) BSquats x 25  Comment #6: ankle orange band R 1) dorsiflexion x15 2)plantar flexion x15  Exercise #7: side stepping x2 laps   Comment #7: step ups x15 catie 6in  Exercise #8: squating x10    Foot/Ankle ROM:    Date: 6/28/17 7/21/17    Ankle ROM( ) AROM in degrees AROM in degrees AROM in degrees    Right Left Right Left Right Left   Dorsiflexion, gastroc (10 ) 3 8  12, 10     Dorsiflexion, soleus (20 ) 15 6  13, 9     Plantar Flexion (50 ) 44 45       Inversion (45-60 ) 40 30       Eversion (15-30 ) 25 30       Great Toe Extension (70 ) 90 53             Treatment Today     TREATMENT MINUTES COMMENTS   Evaluation     Self-care/ Home management     Manual therapy     Neuromuscular Re-education     Therapeutic Activity     Therapeutic Exercises 29 See flow sheet, ROM measurements taken this visit   Gait training     Modality__________________                Total 29    Blank areas are intentional and mean the treatment did not include these items.       Rosita Li, SPT  [facility]  7/21/2017

## 2021-06-15 NOTE — PROGRESS NOTES
[facility] Discharge Summary  Patient Name: Madelin Payton  Date: 1/17/2018  Referral Diagnosis: s/p ankle surgery  Referring provider: Alek Phelan DPM  Visit Diagnosis:   1. Decreased ROM of ankle     2. Acute left ankle pain     3. Asymptomatic postmenopausal status         Goals:  See below    Patient was seen for 8 visits from 6/28/17 to 8/23/17 with 0 missed appointments.  The patient met goals and has demonstrated understanding of and independence in the home program for self-care, and progression to next steps.  She will initiate contact if questions or concerns arise.    Therapy will be discontinued at this time.  The patient will need a new referral to resume.    Thank you for your referral.  [facility]  1/17/2018  2:10 PM

## 2021-07-01 ENCOUNTER — HOSPITAL ENCOUNTER (OUTPATIENT)
Dept: MAMMOGRAPHY | Facility: CLINIC | Age: 66
Discharge: HOME | End: 2021-07-01
Attending: FAMILY MEDICINE

## 2021-07-01 DIAGNOSIS — Z12.31 VISIT FOR SCREENING MAMMOGRAM: ICD-10-CM

## 2021-07-13 ENCOUNTER — RECORDS - HEALTHEAST (OUTPATIENT)
Dept: ADMINISTRATIVE | Facility: CLINIC | Age: 66
End: 2021-07-13

## 2021-07-21 ENCOUNTER — RECORDS - HEALTHEAST (OUTPATIENT)
Dept: ADMINISTRATIVE | Facility: CLINIC | Age: 66
End: 2021-07-21

## 2022-07-06 ENCOUNTER — HOSPITAL ENCOUNTER (OUTPATIENT)
Dept: MAMMOGRAPHY | Facility: CLINIC | Age: 67
Discharge: HOME | End: 2022-07-06
Attending: FAMILY MEDICINE | Admitting: FAMILY MEDICINE
Payer: COMMERCIAL

## 2022-07-06 DIAGNOSIS — Z12.31 VISIT FOR SCREENING MAMMOGRAM: ICD-10-CM

## 2022-07-06 PROCEDURE — 77067 SCR MAMMO BI INCL CAD: CPT

## 2023-02-16 ENCOUNTER — LAB REQUISITION (OUTPATIENT)
Dept: LAB | Facility: CLINIC | Age: 68
End: 2023-02-16

## 2023-02-16 DIAGNOSIS — I10 ESSENTIAL (PRIMARY) HYPERTENSION: ICD-10-CM

## 2023-02-16 DIAGNOSIS — E78.1 PURE HYPERGLYCERIDEMIA: ICD-10-CM

## 2023-02-16 LAB
ANION GAP SERPL CALCULATED.3IONS-SCNC: 14 MMOL/L (ref 7–15)
BUN SERPL-MCNC: 13.8 MG/DL (ref 8–23)
CALCIUM SERPL-MCNC: 9.7 MG/DL (ref 8.8–10.2)
CHLORIDE SERPL-SCNC: 101 MMOL/L (ref 98–107)
CHOLEST SERPL-MCNC: 239 MG/DL
CREAT SERPL-MCNC: 0.7 MG/DL (ref 0.51–0.95)
DEPRECATED HCO3 PLAS-SCNC: 25 MMOL/L (ref 22–29)
GFR SERPL CREATININE-BSD FRML MDRD: >90 ML/MIN/1.73M2
GLUCOSE SERPL-MCNC: 134 MG/DL (ref 70–99)
HDLC SERPL-MCNC: 55 MG/DL
LDLC SERPL CALC-MCNC: 148 MG/DL
NONHDLC SERPL-MCNC: 184 MG/DL
POTASSIUM SERPL-SCNC: 4.3 MMOL/L (ref 3.4–5.3)
SODIUM SERPL-SCNC: 140 MMOL/L (ref 136–145)
TRIGL SERPL-MCNC: 178 MG/DL

## 2023-02-16 PROCEDURE — 82310 ASSAY OF CALCIUM: CPT | Performed by: FAMILY MEDICINE

## 2023-02-16 PROCEDURE — 80061 LIPID PANEL: CPT | Performed by: FAMILY MEDICINE

## 2023-03-16 ENCOUNTER — LAB REQUISITION (OUTPATIENT)
Dept: LAB | Facility: CLINIC | Age: 68
End: 2023-03-16

## 2023-03-16 DIAGNOSIS — I10 ESSENTIAL (PRIMARY) HYPERTENSION: ICD-10-CM

## 2023-03-16 LAB
ANION GAP SERPL CALCULATED.3IONS-SCNC: 12 MMOL/L (ref 7–15)
BUN SERPL-MCNC: 17.6 MG/DL (ref 8–23)
CALCIUM SERPL-MCNC: 9.6 MG/DL (ref 8.8–10.2)
CHLORIDE SERPL-SCNC: 102 MMOL/L (ref 98–107)
CREAT SERPL-MCNC: 0.78 MG/DL (ref 0.51–0.95)
DEPRECATED HCO3 PLAS-SCNC: 26 MMOL/L (ref 22–29)
GFR SERPL CREATININE-BSD FRML MDRD: 82 ML/MIN/1.73M2
GLUCOSE SERPL-MCNC: 100 MG/DL (ref 70–99)
POTASSIUM SERPL-SCNC: 4 MMOL/L (ref 3.4–5.3)
SODIUM SERPL-SCNC: 140 MMOL/L (ref 136–145)

## 2023-03-16 PROCEDURE — 80048 BASIC METABOLIC PNL TOTAL CA: CPT | Performed by: FAMILY MEDICINE

## 2023-05-17 ENCOUNTER — LAB REQUISITION (OUTPATIENT)
Dept: LAB | Facility: CLINIC | Age: 68
End: 2023-05-17

## 2023-05-17 DIAGNOSIS — E78.1 PURE HYPERGLYCERIDEMIA: ICD-10-CM

## 2023-05-17 LAB
CHOLEST SERPL-MCNC: 182 MG/DL
HDLC SERPL-MCNC: 49 MG/DL
LDLC SERPL CALC-MCNC: 108 MG/DL
NONHDLC SERPL-MCNC: 133 MG/DL
TRIGL SERPL-MCNC: 124 MG/DL

## 2023-05-17 PROCEDURE — 80061 LIPID PANEL: CPT | Performed by: FAMILY MEDICINE

## 2023-07-07 ENCOUNTER — HOSPITAL ENCOUNTER (OUTPATIENT)
Dept: MAMMOGRAPHY | Facility: CLINIC | Age: 68
Discharge: HOME | End: 2023-07-07
Attending: FAMILY MEDICINE | Admitting: FAMILY MEDICINE
Payer: COMMERCIAL

## 2023-07-07 DIAGNOSIS — Z12.31 VISIT FOR SCREENING MAMMOGRAM: ICD-10-CM

## 2023-07-07 PROCEDURE — 77067 SCR MAMMO BI INCL CAD: CPT

## 2024-05-07 ENCOUNTER — LAB REQUISITION (OUTPATIENT)
Dept: LAB | Facility: CLINIC | Age: 69
End: 2024-05-07

## 2024-05-07 DIAGNOSIS — I10 ESSENTIAL (PRIMARY) HYPERTENSION: ICD-10-CM

## 2024-05-07 PROCEDURE — 80061 LIPID PANEL: CPT | Performed by: STUDENT IN AN ORGANIZED HEALTH CARE EDUCATION/TRAINING PROGRAM

## 2024-05-07 PROCEDURE — 82040 ASSAY OF SERUM ALBUMIN: CPT | Performed by: STUDENT IN AN ORGANIZED HEALTH CARE EDUCATION/TRAINING PROGRAM

## 2024-05-08 LAB
ALBUMIN SERPL BCG-MCNC: 4.4 G/DL (ref 3.5–5.2)
ALP SERPL-CCNC: 59 U/L (ref 40–150)
ALT SERPL W P-5'-P-CCNC: 17 U/L (ref 0–50)
ANION GAP SERPL CALCULATED.3IONS-SCNC: 11 MMOL/L (ref 7–15)
AST SERPL W P-5'-P-CCNC: 21 U/L (ref 0–45)
BILIRUB SERPL-MCNC: 0.5 MG/DL
BUN SERPL-MCNC: 11.6 MG/DL (ref 8–23)
CALCIUM SERPL-MCNC: 9.5 MG/DL (ref 8.8–10.2)
CHLORIDE SERPL-SCNC: 102 MMOL/L (ref 98–107)
CHOLEST SERPL-MCNC: 220 MG/DL
CREAT SERPL-MCNC: 0.71 MG/DL (ref 0.51–0.95)
DEPRECATED HCO3 PLAS-SCNC: 28 MMOL/L (ref 22–29)
EGFRCR SERPLBLD CKD-EPI 2021: >90 ML/MIN/1.73M2
FASTING STATUS PATIENT QL REPORTED: ABNORMAL
FASTING STATUS PATIENT QL REPORTED: ABNORMAL
GLUCOSE SERPL-MCNC: 112 MG/DL (ref 70–99)
HDLC SERPL-MCNC: 55 MG/DL
LDLC SERPL CALC-MCNC: 140 MG/DL
NONHDLC SERPL-MCNC: 165 MG/DL
POTASSIUM SERPL-SCNC: 4.2 MMOL/L (ref 3.4–5.3)
PROT SERPL-MCNC: 6.9 G/DL (ref 6.4–8.3)
SODIUM SERPL-SCNC: 141 MMOL/L (ref 135–145)
TRIGL SERPL-MCNC: 125 MG/DL

## 2024-07-08 ENCOUNTER — HOSPITAL ENCOUNTER (OUTPATIENT)
Dept: MAMMOGRAPHY | Facility: CLINIC | Age: 69
Discharge: HOME | End: 2024-07-08
Attending: STUDENT IN AN ORGANIZED HEALTH CARE EDUCATION/TRAINING PROGRAM | Admitting: STUDENT IN AN ORGANIZED HEALTH CARE EDUCATION/TRAINING PROGRAM
Payer: COMMERCIAL

## 2024-07-08 DIAGNOSIS — Z12.31 VISIT FOR SCREENING MAMMOGRAM: ICD-10-CM

## 2024-07-08 PROCEDURE — 77063 BREAST TOMOSYNTHESIS BI: CPT

## 2025-05-07 ENCOUNTER — LAB REQUISITION (OUTPATIENT)
Dept: LAB | Facility: CLINIC | Age: 70
End: 2025-05-07

## 2025-05-07 DIAGNOSIS — I10 ESSENTIAL (PRIMARY) HYPERTENSION: ICD-10-CM

## 2025-05-07 LAB
ALBUMIN SERPL BCG-MCNC: 4.3 G/DL (ref 3.5–5.2)
ALP SERPL-CCNC: 62 U/L (ref 40–150)
ALT SERPL W P-5'-P-CCNC: 17 U/L (ref 0–50)
ANION GAP SERPL CALCULATED.3IONS-SCNC: 12 MMOL/L (ref 7–15)
AST SERPL W P-5'-P-CCNC: 21 U/L (ref 0–45)
BILIRUB SERPL-MCNC: 0.6 MG/DL
BUN SERPL-MCNC: 16.9 MG/DL (ref 8–23)
CALCIUM SERPL-MCNC: 9.4 MG/DL (ref 8.8–10.4)
CHLORIDE SERPL-SCNC: 104 MMOL/L (ref 98–107)
CHOLEST SERPL-MCNC: 198 MG/DL
CREAT SERPL-MCNC: 0.74 MG/DL (ref 0.51–0.95)
EGFRCR SERPLBLD CKD-EPI 2021: 86 ML/MIN/1.73M2
FASTING STATUS PATIENT QL REPORTED: YES
FASTING STATUS PATIENT QL REPORTED: YES
GLUCOSE SERPL-MCNC: 120 MG/DL (ref 70–99)
HCO3 SERPL-SCNC: 25 MMOL/L (ref 22–29)
HDLC SERPL-MCNC: 53 MG/DL
LDLC SERPL CALC-MCNC: 121 MG/DL
NONHDLC SERPL-MCNC: 145 MG/DL
POTASSIUM SERPL-SCNC: 4.1 MMOL/L (ref 3.4–5.3)
PROT SERPL-MCNC: 6.4 G/DL (ref 6.4–8.3)
SODIUM SERPL-SCNC: 141 MMOL/L (ref 135–145)
TRIGL SERPL-MCNC: 120 MG/DL

## 2025-05-07 PROCEDURE — 84155 ASSAY OF PROTEIN SERUM: CPT | Performed by: STUDENT IN AN ORGANIZED HEALTH CARE EDUCATION/TRAINING PROGRAM

## 2025-05-07 PROCEDURE — 83718 ASSAY OF LIPOPROTEIN: CPT | Performed by: STUDENT IN AN ORGANIZED HEALTH CARE EDUCATION/TRAINING PROGRAM
